# Patient Record
Sex: MALE
[De-identification: names, ages, dates, MRNs, and addresses within clinical notes are randomized per-mention and may not be internally consistent; named-entity substitution may affect disease eponyms.]

---

## 2024-03-13 ENCOUNTER — APPOINTMENT (OUTPATIENT)
Dept: ORTHOPEDIC SURGERY | Facility: CLINIC | Age: 29
End: 2024-03-13
Payer: COMMERCIAL

## 2024-03-13 VITALS — HEIGHT: 69 IN | WEIGHT: 210 LBS | BODY MASS INDEX: 31.1 KG/M2

## 2024-03-13 DIAGNOSIS — Z78.9 OTHER SPECIFIED HEALTH STATUS: ICD-10-CM

## 2024-03-13 DIAGNOSIS — S92.315A NONDISPLACED FRACTURE OF FIRST METATARSAL BONE, LEFT FOOT, INITIAL ENCOUNTER FOR CLOSED FRACTURE: ICD-10-CM

## 2024-03-13 PROBLEM — Z00.00 ENCOUNTER FOR PREVENTIVE HEALTH EXAMINATION: Status: ACTIVE | Noted: 2024-03-13

## 2024-03-13 PROCEDURE — 99202 OFFICE O/P NEW SF 15 MIN: CPT | Mod: 25

## 2024-03-13 PROCEDURE — 28470 CLTX METATARSAL FX WO MNP EA: CPT | Mod: LT

## 2024-03-13 PROCEDURE — 73630 X-RAY EXAM OF FOOT: CPT | Mod: LT

## 2024-03-13 NOTE — PHYSICAL EXAM
[Moderate] : moderate swelling of lateral foot [Mild] : mild swelling of toe(s) [1st] : 1st [NL (40)] : plantar flexion 40 degrees [5___] : dorsiflexion 5[unfilled]/5 [4___] : eversion 4[unfilled]/5 [2+] : posterior tibialis pulse: 2+ [Normal] : sural nerve sensation normal [] : ambulation with crutches [Left] : left foot [de-identified] : EHL/FHL function intact [de-identified] : inversion 15 degrees [de-identified] : Nondisplaced 1st metatarsal neck fracture. [de-identified] : eversion 15 degrees [TWNoteComboBox7] : dorsiflexion 10 degrees

## 2024-03-13 NOTE — DISCUSSION/SUMMARY
[de-identified] : Patient's fracture is currently stable. He will be nwb in a cam boot provided today. Ice/nsaids and elevation encouraged.  Repeat x-ray will be performed at the next office visit (right foot)

## 2024-03-13 NOTE — RETURN TO WORK/SCHOOL
[FreeTextEntry1] : The patient is unable to work at this time. This will be reevaluated at their next office visit.

## 2024-03-13 NOTE — HISTORY OF PRESENT ILLNESS
[Sudden] : sudden [4] : 4 [Sharp] : sharp [Frequent] : frequent [Meds] : meds [Standing] : standing [Walking] : walking [de-identified] : Patient is a 28 year old male complaining of left foot pain since the leg press came crashing down on him at the gym on 3/11/24.  He went to [facility] ER and was diagnosed with fracture of the 1st metatarsal.  He was placed in a splint and has been nwb with crutches since.  No prior left foot issues. [FreeTextEntry3] : 3/11/24 [] : no [de-identified] : xray [de-identified] : [facility]

## 2024-03-28 ENCOUNTER — APPOINTMENT (OUTPATIENT)
Dept: ORTHOPEDIC SURGERY | Facility: CLINIC | Age: 29
End: 2024-03-28
Payer: COMMERCIAL

## 2024-03-28 VITALS — HEIGHT: 69 IN | WEIGHT: 210 LBS | BODY MASS INDEX: 31.1 KG/M2

## 2024-03-28 PROCEDURE — 99024 POSTOP FOLLOW-UP VISIT: CPT

## 2024-03-28 PROCEDURE — 73630 X-RAY EXAM OF FOOT: CPT | Mod: LT

## 2024-03-28 NOTE — DISCUSSION/SUMMARY
[de-identified] : Patient is doing well. He will remain nwb in his cam boot. Home ROM exercises for the ankle was recommended and demonstrated. Ice/nsaids prn.  Repeat x-ray will be performed at the next office visit (right foot)

## 2024-03-28 NOTE — HISTORY OF PRESENT ILLNESS
[Sudden] : sudden [4] : 4 [Sharp] : sharp [Frequent] : frequent [Meds] : meds [Standing] : standing [Walking] : walking [de-identified] : Patient  returns for his left 1st metatarsal fracture from 3/11/24.  He has been nwb in his cam boot but admits to falling a few times.  Overall he is feeling better. [FreeTextEntry1] : left foot [] : no [FreeTextEntry3] : 3/11/24 [FreeTextEntry9] : short cam boot

## 2024-03-28 NOTE — PHYSICAL EXAM
[1st] : 1st [NL (40)] : plantar flexion 40 degrees [5___] : plantar flexion 5[unfilled]/5 [2+] : posterior tibialis pulse: 2+ [Normal] : sural nerve sensation normal [Left] : left foot [Mild] : mild swelling of lateral foot [4___] : [facility] 4[unfilled]/5 [] : no pain when stressing lateral tarsal metatarsal joint [de-identified] : Nondisplaced 1st metatarsal neck fracture with similar impaction of dorsal cortex compared to prior xray.  No change in alignment compared to prior xray. [de-identified] : EHL/FHL function intact [TWNoteComboBox7] : dorsiflexion 10 degrees [de-identified] : inversion 15 degrees [de-identified] : eversion 15 degrees

## 2024-03-28 NOTE — REVIEW OF SYSTEMS
[Joint Pain] : joint pain [Joint Swelling] : joint swelling [Negative] : Heme/Lymph [FreeTextEntry9] : decreased swelling

## 2024-04-11 ENCOUNTER — APPOINTMENT (OUTPATIENT)
Dept: ORTHOPEDIC SURGERY | Facility: CLINIC | Age: 29
End: 2024-04-11
Payer: COMMERCIAL

## 2024-04-11 VITALS — BODY MASS INDEX: 31.1 KG/M2 | HEIGHT: 69 IN | WEIGHT: 210 LBS

## 2024-04-11 PROCEDURE — 73630 X-RAY EXAM OF FOOT: CPT | Mod: LT

## 2024-04-11 PROCEDURE — 99024 POSTOP FOLLOW-UP VISIT: CPT

## 2024-04-11 NOTE — DISCUSSION/SUMMARY
[de-identified] : Patient continues to improve. He can now be wbat in his cam boot. Home ROM exercises will be continued. Ice/nsaids prn.  Repeat x-ray will be performed at the next office visit (right foot)

## 2024-04-11 NOTE — HISTORY OF PRESENT ILLNESS
[Sudden] : sudden [2] : 2 [Localized] : localized [Frequent] : frequent [Rest] : rest [Standing] : standing [Walking] : walking [de-identified] : Patient  returns for his left 1st metatarsal fracture from 3/11/24.  He has been nwb in his cam boot and reports doing much better.  No pain at this time. [] : no [FreeTextEntry1] : left foot [FreeTextEntry3] : 3/11/24 [FreeTextEntry9] : short cam boot  [de-identified] : pressure on it

## 2024-04-11 NOTE — PHYSICAL EXAM
[Mild] : mild swelling of toe(s) [1st] : 1st [NL (40)] : plantar flexion 40 degrees [5___] : plantar flexion 5[unfilled]/5 [4___] : [facility] 4[unfilled]/5 [2+] : posterior tibialis pulse: 2+ [Normal] : saphenous nerve sensation normal [Left] : left foot [] : no pain when stressing lateral tarsal metatarsal joint [The fracture is in acceptable alignment. There is progression in healing seen] : The fracture is in acceptable alignment. There is progression in healing seen [de-identified] : EHL/FHL function intact [de-identified] : Nondisplaced 1st metatarsal neck fracture with similar impaction of dorsal cortex compared to prior xray.  No change in alignment compared to prior xray.  Some increasing consolidation compared to prior xray. [TWNoteComboBox7] : dorsiflexion 10 degrees [de-identified] : inversion 20 degrees [de-identified] : eversion 15 degrees

## 2024-04-11 NOTE — REVIEW OF SYSTEMS
[Joint Pain] : joint pain [Joint Stiffness] : joint stiffness [Joint Swelling] : joint swelling [Negative] : Heme/Lymph [FreeTextEntry9] : decreased swelling

## 2024-05-02 ENCOUNTER — APPOINTMENT (OUTPATIENT)
Dept: ORTHOPEDIC SURGERY | Facility: CLINIC | Age: 29
End: 2024-05-02
Payer: COMMERCIAL

## 2024-05-02 VITALS — WEIGHT: 210 LBS | BODY MASS INDEX: 31.1 KG/M2 | HEIGHT: 69 IN

## 2024-05-02 PROCEDURE — 73630 X-RAY EXAM OF FOOT: CPT | Mod: LT

## 2024-05-02 PROCEDURE — 99024 POSTOP FOLLOW-UP VISIT: CPT

## 2024-05-02 NOTE — PHYSICAL EXAM
[NL (40)] : plantar flexion 40 degrees [4___] : [facility] 4[unfilled]/5 [2+] : posterior tibialis pulse: 2+ [Normal] : saphenous nerve sensation normal [Left] : left foot [The fracture is in acceptable alignment. There is progression in healing seen] : The fracture is in acceptable alignment. There is progression in healing seen [NL 30)] : inversion 30 degrees [NL (20)] : eversion 20 degrees [5___] : [facility] 5[unfilled]/5 [] : no pain when stressing lateral tarsal metatarsal joint [de-identified] : EHL/FHL function intact [de-identified] : Nondisplaced 1st metatarsal neck fracture with similar impaction of dorsal cortex compared to prior xray.  No change in alignment compared to prior xray.  Near complete resolution of fracture line. [TWNoteComboBox7] : dorsiflexion 15 degrees

## 2024-05-02 NOTE — DISCUSSION/SUMMARY
[de-identified] : Patient continues to improve. He can now be wbat in a supportive shoe or sneaker. No high impact activities. Ice/nsaids prn.  Repeat x-ray will be performed at the next office visit (right foot)

## 2024-05-30 ENCOUNTER — APPOINTMENT (OUTPATIENT)
Dept: ORTHOPEDIC SURGERY | Facility: CLINIC | Age: 29
End: 2024-05-30
Payer: COMMERCIAL

## 2024-05-30 VITALS — WEIGHT: 210 LBS | HEIGHT: 69 IN | BODY MASS INDEX: 31.1 KG/M2

## 2024-05-30 PROCEDURE — 73630 X-RAY EXAM OF FOOT: CPT | Mod: LT

## 2024-05-30 PROCEDURE — 99024 POSTOP FOLLOW-UP VISIT: CPT

## 2024-05-30 NOTE — PHYSICAL EXAM
[NL (40)] : plantar flexion 40 degrees [NL 30)] : inversion 30 degrees [2+] : posterior tibialis pulse: 2+ [Normal] : saphenous nerve sensation normal [Left] : left foot [The fracture is in acceptable alignment. There is progression in healing seen] : The fracture is in acceptable alignment. There is progression in healing seen [NL (20)] : dorsiflexion 20 degrees [5___] : [facility] 5[unfilled]/5 [] : patient ambulates without assistive device [de-identified] : EHL/FHL function intact [de-identified] : Nondisplaced 1st metatarsal neck fracture with similar impaction of dorsal cortex compared to prior xray. Increased callus compared to prior xray with fracture line much less visible and no change in alignment.

## 2024-05-30 NOTE — HISTORY OF PRESENT ILLNESS
[Sudden] : sudden [2] : 2 [Localized] : localized [Frequent] : frequent [Rest] : rest [Standing] : standing [Walking] : walking [de-identified] : Patient  returns for his left 1st metatarsal fracture from 3/11/24.  He has been wb in supportive shoes.  He reports doing well.  He reports some "throbbing soreness" on occasion, but no significant pain.  Overall he feels he is doing well. [] : no [FreeTextEntry1] : left foot [FreeTextEntry3] : 3/11/24 [FreeTextEntry9] : short cam boot  [de-identified] : pressure on it

## 2024-05-30 NOTE — RETURN TO WORK/SCHOOL
[FreeTextEntry1] : The patient has been cleared to return to work, full duty and without limitations.

## 2024-05-30 NOTE — DISCUSSION/SUMMARY
[de-identified] : Patient continues to improve. His physical exam is now normal. He can return to work as a . He can slowly increase his activity level as tolerated.   Repeat x-ray will be performed at the next office visit (right foot)

## 2024-06-27 ENCOUNTER — APPOINTMENT (OUTPATIENT)
Dept: ORTHOPEDIC SURGERY | Facility: CLINIC | Age: 29
End: 2024-06-27
Payer: COMMERCIAL

## 2024-06-27 VITALS — BODY MASS INDEX: 31.1 KG/M2 | WEIGHT: 210 LBS | HEIGHT: 69 IN

## 2024-06-27 DIAGNOSIS — S92.315D NONDISPLACED FRACTURE OF FIRST METATARSAL BONE, LEFT FOOT, SUBSEQUENT ENCOUNTER FOR FRACTURE WITH ROUTINE HEALING: ICD-10-CM

## 2024-06-27 PROCEDURE — 99024 POSTOP FOLLOW-UP VISIT: CPT

## 2024-06-27 PROCEDURE — 73630 X-RAY EXAM OF FOOT: CPT | Mod: LT
